# Patient Record
Sex: FEMALE | HISPANIC OR LATINO | Employment: FULL TIME
[De-identification: names, ages, dates, MRNs, and addresses within clinical notes are randomized per-mention and may not be internally consistent; named-entity substitution may affect disease eponyms.]

---

## 2017-07-21 ENCOUNTER — LAB SERVICES (OUTPATIENT)
Dept: OTHER | Age: 27
End: 2017-07-21

## 2017-07-21 ENCOUNTER — CHARTING TRANS (OUTPATIENT)
Dept: OBGYN | Age: 27
End: 2017-07-21

## 2017-07-21 ENCOUNTER — MYAURORA ACCOUNT LINK (OUTPATIENT)
Dept: OTHER | Age: 27
End: 2017-07-21

## 2017-07-21 LAB — HIV1+2 AB SERPL QL IA: NEGATIVE

## 2017-07-22 LAB — HCV AB SER QL: NEGATIVE

## 2017-07-24 LAB — RPR SER QL: NORMAL

## 2017-08-03 LAB — AP REPORT: ABNORMAL

## 2017-09-05 ENCOUNTER — CHARTING TRANS (OUTPATIENT)
Dept: OBGYN | Age: 27
End: 2017-09-05

## 2017-09-05 ENCOUNTER — MYAURORA ACCOUNT LINK (OUTPATIENT)
Dept: OTHER | Age: 27
End: 2017-09-05

## 2017-09-05 ENCOUNTER — LAB SERVICES (OUTPATIENT)
Dept: OTHER | Age: 27
End: 2017-09-05

## 2017-09-05 LAB — PREGNANCY TEST, URINE: NEGATIVE

## 2018-02-01 ENCOUNTER — CHARTING TRANS (OUTPATIENT)
Dept: OTHER | Age: 28
End: 2018-02-01

## 2018-07-18 ENCOUNTER — CHARTING TRANS (OUTPATIENT)
Dept: OTHER | Age: 28
End: 2018-07-18

## 2018-07-18 ENCOUNTER — MYAURORA ACCOUNT LINK (OUTPATIENT)
Dept: OTHER | Age: 28
End: 2018-07-18

## 2018-07-18 ENCOUNTER — LAB SERVICES (OUTPATIENT)
Dept: OTHER | Age: 28
End: 2018-07-18

## 2018-07-18 LAB
HBV SURFACE AG SERPL QL IA: NEGATIVE
HIV1+2 AB SERPL QL IA: NEGATIVE

## 2018-07-19 LAB
HBV CORE IGM SER QL: NEGATIVE
HCV AB SER QL: NEGATIVE

## 2018-07-20 ENCOUNTER — CHARTING TRANS (OUTPATIENT)
Dept: OTHER | Age: 28
End: 2018-07-20

## 2018-07-22 LAB — RPR SER QL: NORMAL

## 2018-07-24 LAB — AP REPORT: NORMAL

## 2018-10-01 ENCOUNTER — CHARTING TRANS (OUTPATIENT)
Dept: OTHER | Age: 28
End: 2018-10-01

## 2018-11-16 ENCOUNTER — LAB SERVICES (OUTPATIENT)
Dept: OTHER | Age: 28
End: 2018-11-16

## 2018-11-16 ENCOUNTER — CHARTING TRANS (OUTPATIENT)
Dept: OTHER | Age: 28
End: 2018-11-16

## 2018-11-16 ENCOUNTER — MYAURORA ACCOUNT LINK (OUTPATIENT)
Dept: OTHER | Age: 28
End: 2018-11-16

## 2018-11-16 LAB
ALBUMIN SERPL-MCNC: 4.4 G/DL (ref 3.6–5.1)
ALP SERPL-CCNC: 84 U/L (ref 45–130)
ALT SERPL W/O P-5'-P-CCNC: 22 U/L (ref 4–38)
AST SERPL-CCNC: 23 U/L (ref 14–43)
BASOPHIL %: 0.7 % (ref 0–1.2)
BASOPHIL ABSOLUTE #: 0.1 10*3/UL (ref 0–0.1)
BILIRUB SERPL-MCNC: 0.2 MG/DL (ref 0–1.3)
BUN SERPL-MCNC: 15 MG/DL (ref 7–20)
CALCIUM SERPL-MCNC: 10.2 MG/DL (ref 8.6–10.6)
CHLORIDE SERPL-SCNC: 103 MMOL/L (ref 96–107)
CHOLEST SERPL-MCNC: 205 MG/DL (ref 140–200)
CO2 SERPL-SCNC: 30 MMOL/L (ref 22–32)
CREAT SERPL-MCNC: 0.7 MG/DL (ref 0.5–1.4)
DIFFERENTIAL TYPE: ABNORMAL
EOSINOPHIL %: 4.1 % (ref 0–10)
EOSINOPHIL ABSOLUTE #: 0.4 10*3/UL (ref 0–0.5)
EST. AVERAGE GLUCOSE BLD GHB EST-MCNC: 106 MG/DL (ref 0–154)
GFR SERPL CREATININE-BSD FRML MDRD: >60 ML/MIN/{1.73M2}
GFR SERPL CREATININE-BSD FRML MDRD: >60 ML/MIN/{1.73M2}
GLUCOSE P FAST SERPL-MCNC: 97 MG/DL (ref 60–100)
HBA1C BLD-MCNC: 5.3 % (ref 4.2–6)
HDLC SERPL-MCNC: 68 MG/DL
HEMATOCRIT: 41.7 % (ref 34–45)
HEMOGLOBIN: 13.6 G/DL (ref 11.2–15.7)
LDLC SERPL CALC-MCNC: 124 MG/DL (ref 30–100)
LYMPH PERCENT: 43 % (ref 20.5–51.1)
LYMPHOCYTE ABSOLUTE #: 3.8 10*3/UL (ref 1.2–3.4)
MEAN CORPUSCULAR HGB CONCENTRATION: 32.6 % (ref 32–36)
MEAN CORPUSCULAR HGB: 29.3 PG (ref 27–34)
MEAN CORPUSCULAR VOLUME: 89.9 FL (ref 79–95)
MEAN PLATELET VOLUME: 11 FL (ref 8.6–12.4)
MONOCYTE ABSOLUTE #: 0.7 10*3/UL (ref 0.2–0.9)
MONOCYTE PERCENT: 8 % (ref 4.3–12.9)
NEUTROPHIL ABSOLUTE #: 3.9 10*3/UL (ref 1.4–6.5)
NEUTROPHIL PERCENT: 44.2 % (ref 34–73.5)
PLATELET COUNT: 221 10*3/UL (ref 150–400)
POTASSIUM SERPL-SCNC: 4.5 MMOL/L (ref 3.5–5.3)
PROT SERPL-MCNC: 8.3 G/DL (ref 6.4–8.5)
RED BLOOD CELL COUNT: 4.64 10*6/UL (ref 3.7–5.2)
RED CELL DISTRIBUTION WIDTH: 12.6 % (ref 11.3–14.8)
SODIUM SERPL-SCNC: 141 MMOL/L (ref 136–146)
TRIGL SERPL-MCNC: 67 MG/DL (ref 0–200)
TSH SERPL DL<=0.05 MIU/L-ACNC: 1.49 M[IU]/L (ref 0.3–4.82)
WHITE BLOOD CELL COUNT: 8.8 10*3/UL (ref 4–10)

## 2018-11-28 VITALS
SYSTOLIC BLOOD PRESSURE: 104 MMHG | BODY MASS INDEX: 23.92 KG/M2 | DIASTOLIC BLOOD PRESSURE: 74 MMHG | HEIGHT: 62 IN | WEIGHT: 130 LBS

## 2018-11-28 VITALS
WEIGHT: 130 LBS | HEIGHT: 62 IN | BODY MASS INDEX: 23.92 KG/M2 | DIASTOLIC BLOOD PRESSURE: 72 MMHG | SYSTOLIC BLOOD PRESSURE: 90 MMHG

## 2019-03-05 VITALS
BODY MASS INDEX: 24.66 KG/M2 | HEART RATE: 70 BPM | HEIGHT: 62 IN | TEMPERATURE: 96.6 F | SYSTOLIC BLOOD PRESSURE: 100 MMHG | WEIGHT: 134 LBS | DIASTOLIC BLOOD PRESSURE: 70 MMHG

## 2019-03-05 VITALS
SYSTOLIC BLOOD PRESSURE: 110 MMHG | WEIGHT: 138 LBS | HEIGHT: 62 IN | BODY MASS INDEX: 25.4 KG/M2 | DIASTOLIC BLOOD PRESSURE: 64 MMHG

## 2019-11-29 ENCOUNTER — LAB SERVICES (OUTPATIENT)
Dept: LAB | Age: 29
End: 2019-11-29

## 2019-11-29 ENCOUNTER — OFFICE VISIT (OUTPATIENT)
Dept: OBGYN | Age: 29
End: 2019-11-29

## 2019-11-29 VITALS
WEIGHT: 137 LBS | HEIGHT: 62 IN | DIASTOLIC BLOOD PRESSURE: 64 MMHG | SYSTOLIC BLOOD PRESSURE: 106 MMHG | BODY MASS INDEX: 25.21 KG/M2

## 2019-11-29 DIAGNOSIS — Z12.4 PAP SMEAR FOR CERVICAL CANCER SCREENING: ICD-10-CM

## 2019-11-29 DIAGNOSIS — Z11.3 SCREEN FOR STD (SEXUALLY TRANSMITTED DISEASE): ICD-10-CM

## 2019-11-29 DIAGNOSIS — Z01.419 ENCOUNTER FOR GYNECOLOGICAL EXAMINATION WITHOUT ABNORMAL FINDING: Primary | ICD-10-CM

## 2019-11-29 DIAGNOSIS — Z12.4 ENCOUNTER FOR SCREENING FOR MALIGNANT NEOPLASM OF CERVIX: ICD-10-CM

## 2019-11-29 LAB
HBV SURFACE AG SERPL QL IA: NEGATIVE
HEPATITIS C ANTIBODY: NEGATIVE
HIV1+2 AB SERPL QL IA: NEGATIVE

## 2019-11-29 PROCEDURE — 88142 CYTOPATH C/V THIN LAYER: CPT | Performed by: PATHOLOGY

## 2019-11-29 PROCEDURE — 99395 PREV VISIT EST AGE 18-39: CPT | Performed by: NURSE PRACTITIONER

## 2019-11-29 PROCEDURE — 36415 COLL VENOUS BLD VENIPUNCTURE: CPT | Performed by: NURSE PRACTITIONER

## 2019-11-29 PROCEDURE — 86592 SYPHILIS TEST NON-TREP QUAL: CPT | Performed by: NURSE PRACTITIONER

## 2019-11-29 PROCEDURE — 87491 CHLMYD TRACH DNA AMP PROBE: CPT | Performed by: PATHOLOGY

## 2019-11-29 PROCEDURE — 87340 HEPATITIS B SURFACE AG IA: CPT | Performed by: NURSE PRACTITIONER

## 2019-11-29 PROCEDURE — 87591 N.GONORRHOEAE DNA AMP PROB: CPT | Performed by: PATHOLOGY

## 2019-11-29 PROCEDURE — 86703 HIV-1/HIV-2 1 RESULT ANTBDY: CPT | Performed by: NURSE PRACTITIONER

## 2019-11-29 PROCEDURE — 86803 HEPATITIS C AB TEST: CPT | Performed by: NURSE PRACTITIONER

## 2019-11-29 RX ORDER — ETONOGESTREL AND ETHINYL ESTRADIOL VAGINAL .015; .12 MG/D; MG/D
RING VAGINAL DAILY
COMMUNITY
Start: 2017-01-26 | End: 2019-11-29 | Stop reason: SDUPTHER

## 2019-11-29 RX ORDER — FLUCONAZOLE 150 MG/1
TABLET ORAL
Qty: 2 TABLET | Refills: 1 | Status: SHIPPED | OUTPATIENT
Start: 2019-11-29

## 2019-11-29 RX ORDER — ETONOGESTREL AND ETHINYL ESTRADIOL VAGINAL .015; .12 MG/D; MG/D
RING VAGINAL
Qty: 3 EACH | Refills: 4 | Status: SHIPPED | OUTPATIENT
Start: 2019-11-29

## 2019-11-29 SDOH — HEALTH STABILITY: MENTAL HEALTH: HOW OFTEN DO YOU HAVE A DRINK CONTAINING ALCOHOL?: 2-4 TIMES A MONTH

## 2019-11-30 LAB
C TRACH DNA SPEC QL NAA+PROBE: NEGATIVE
N GONORRHOEA DNA SPEC QL NAA+PROBE: NEGATIVE
RPR SER QL: NORMAL

## 2019-11-30 ASSESSMENT — PATIENT HEALTH QUESTIONNAIRE - PHQ9
2. FEELING DOWN, DEPRESSED OR HOPELESS: NOT AT ALL
SUM OF ALL RESPONSES TO PHQ9 QUESTIONS 1 AND 2: 0
1. LITTLE INTEREST OR PLEASURE IN DOING THINGS: NOT AT ALL
SUM OF ALL RESPONSES TO PHQ9 QUESTIONS 1 AND 2: 0

## 2019-12-05 LAB — AP REPORT: NORMAL

## 2019-12-05 RX ORDER — METRONIDAZOLE 500 MG/1
500 TABLET ORAL 2 TIMES DAILY
Qty: 14 TABLET | Refills: 0 | Status: SHIPPED | OUTPATIENT
Start: 2019-12-05 | End: 2019-12-12

## 2023-03-05 PROBLEM — E55.9 VITAMIN D DEFICIENCY: Status: ACTIVE | Noted: 2023-03-05

## 2023-03-05 PROBLEM — E78.1 PURE HYPERGLYCERIDEMIA: Status: ACTIVE | Noted: 2023-03-05

## 2023-03-05 PROBLEM — F33.0 MAJOR DEPRESSIVE DISORDER, RECURRENT EPISODE, MILD (CMD): Status: ACTIVE | Noted: 2023-03-05

## 2024-02-01 ENCOUNTER — LAB ENCOUNTER (OUTPATIENT)
Dept: LAB | Age: 34
End: 2024-02-01
Attending: NURSE PRACTITIONER
Payer: COMMERCIAL

## 2024-02-01 ENCOUNTER — OFFICE VISIT (OUTPATIENT)
Dept: OBGYN CLINIC | Facility: CLINIC | Age: 34
End: 2024-02-01
Payer: COMMERCIAL

## 2024-02-01 VITALS
DIASTOLIC BLOOD PRESSURE: 82 MMHG | HEIGHT: 62 IN | SYSTOLIC BLOOD PRESSURE: 124 MMHG | BODY MASS INDEX: 29.35 KG/M2 | WEIGHT: 159.5 LBS | HEART RATE: 107 BPM

## 2024-02-01 DIAGNOSIS — Z11.3 SCREEN FOR STD (SEXUALLY TRANSMITTED DISEASE): ICD-10-CM

## 2024-02-01 DIAGNOSIS — Z30.44 ENCOUNTER FOR SURVEILLANCE OF VAGINAL RING HORMONAL CONTRACEPTIVE DEVICE: ICD-10-CM

## 2024-02-01 DIAGNOSIS — Z01.419 WELL WOMAN EXAM WITH ROUTINE GYNECOLOGICAL EXAM: Primary | ICD-10-CM

## 2024-02-01 DIAGNOSIS — Z11.51 SCREENING FOR HUMAN PAPILLOMAVIRUS (HPV): ICD-10-CM

## 2024-02-01 DIAGNOSIS — Z12.4 SCREENING FOR CERVICAL CANCER: ICD-10-CM

## 2024-02-01 LAB — T PALLIDUM AB SER QL IA: NONREACTIVE

## 2024-02-01 PROCEDURE — 87389 HIV-1 AG W/HIV-1&-2 AB AG IA: CPT

## 2024-02-01 PROCEDURE — 87491 CHLMYD TRACH DNA AMP PROBE: CPT | Performed by: NURSE PRACTITIONER

## 2024-02-01 PROCEDURE — 87625 HPV TYPES 16 & 18 ONLY: CPT | Performed by: NURSE PRACTITIONER

## 2024-02-01 PROCEDURE — 88175 CYTOPATH C/V AUTO FLUID REDO: CPT | Performed by: NURSE PRACTITIONER

## 2024-02-01 PROCEDURE — 3074F SYST BP LT 130 MM HG: CPT | Performed by: NURSE PRACTITIONER

## 2024-02-01 PROCEDURE — 87624 HPV HI-RISK TYP POOLED RSLT: CPT | Performed by: NURSE PRACTITIONER

## 2024-02-01 PROCEDURE — 87591 N.GONORRHOEAE DNA AMP PROB: CPT | Performed by: NURSE PRACTITIONER

## 2024-02-01 PROCEDURE — 3079F DIAST BP 80-89 MM HG: CPT | Performed by: NURSE PRACTITIONER

## 2024-02-01 PROCEDURE — 99385 PREV VISIT NEW AGE 18-39: CPT | Performed by: NURSE PRACTITIONER

## 2024-02-01 PROCEDURE — 86780 TREPONEMA PALLIDUM: CPT

## 2024-02-01 PROCEDURE — 3008F BODY MASS INDEX DOCD: CPT | Performed by: NURSE PRACTITIONER

## 2024-02-01 RX ORDER — PHENTERMINE HYDROCHLORIDE 37.5 MG/1
TABLET ORAL
COMMUNITY
Start: 2023-12-19

## 2024-02-01 RX ORDER — ETONOGESTREL/ETHINYL ESTRADIOL .12-.015MG
RING, VAGINAL VAGINAL
COMMUNITY
Start: 2024-01-06 | End: 2024-02-01

## 2024-02-01 RX ORDER — ETONOGESTREL AND ETHINYL ESTRADIOL VAGINAL RING .015; .12 MG/D; MG/D
1 RING VAGINAL
Qty: 3 EACH | Refills: 3 | Status: SHIPPED | OUTPATIENT
Start: 2024-02-01 | End: 2025-04-26

## 2024-02-01 NOTE — PROGRESS NOTES
Subjective:  Chief Complaint   Patient presents with    Physical     Annual with STI testing     33 year old female  presents for annual.    Patient's last menstrual period was 2024 (approximate).  Hx Prior Abnormal Pap: Yes  Pap Date: 19  Pap Result Notes: wnl  Follow Up Recommendation: Hx colpo  Menarche: 15 (2024  2:35 PM)  Period Cycle (Days): 28 days (2024  2:35 PM)  Period Duration (Days): 3 days (2024  2:35 PM)  Period Flow: first day heavy then moderate then light (2024  2:35 PM)  Use of Birth Control (if yes, specify type): Nuvaring (2024  2:35 PM)  Hx Prior Abnormal Pap: Yes (2024  2:35 PM)  Pap Date: 19 (2024  2:35 PM)  Pap Result Notes: wnl (2024  2:35 PM)  Follow Up Recommendation: Hx colpo (2024  2:35 PM)    Sexually active with male partner  Requesting STD testing today     Contraception: Nuva ring - tolerating well, would like refill    Denies personal family history of blood clotting disorder  Endorses migraines but denies aura, no increase in migraine since nuva ring    Denies family history of breast, ovarian and colon CA.    Feeling safe at home.    Most Recent Immunizations   Administered Date(s) Administered    Covid-19 Vaccine Moderna 100 mcg/0.5 ml 2021      reports that she has never smoked. She has never used smokeless tobacco.   reports current alcohol use.    History reviewed. No pertinent past medical history.  Past Surgical History:   Procedure Laterality Date    JAW SURGERY      underbite       Review of Systems:  Pertinent items are noted in the HPI.    Objective:  /82   Pulse 107   Ht 62\"   Wt 159 lb 8 oz (72.3 kg)   LMP 2024 (Approximate)   BMI 29.17 kg/m²    Physical Examination:  General appearance: Well dressed, well nourished in no apparent distress  Neurologic/Psychiatric: Alert and oriented to person, place and time, mood normal, affect appropriate  Head: Normocephalic without obvious  deformity, atraumatic  Neck: No thyromegaly, supple, non-tender, no masses, no adenopathy  Lungs: Clear to auscultation bilaterally, no rales, wheezes or rhonchi  Breasts: Symmetric, non-tender, no masses, lesions, retraction, dimpling or discharge bilaterally, no axillary or supraclavicular lymphadenopathy  Heart: Regular rate and rhythm, no gallops or murmurs  Abdomen: Soft, non-tender, non-distended, no masses, no hepatosplenomegaly, no hernias, no inguinal lymphadenopathy  Pelvic:    External genitalia- Normal, Bartholin's, urethra, skeins glands normal   Vagina- No vaginal lesions, physiologic discharge   Cervix- No lesions, long/closed, no cervical motion tenderness   Uterus- Normal sized, non-tender, no masses   Adnexa-  Non-tender, no masses  Extremities: Non-tender, full range of motion, no clubbing, cyanosis or edema  Skin:  General inspection- no rashes, lesions or discoloration  Pap smear done    Assessment/Plan:  Normal well-woman exam.  Yearly mammogram at age 42.  Pap smear obtained.      Diagnoses and all orders for this visit:    Well woman exam with routine gynecological exam  - self breast exam discussed and encouraged    Screening for cervical cancer  -     ThinPrep PAP Smear; Future    Screening for human papillomavirus (HPV)  -     Hpv Dna  High Risk , Thin Prep Collect; Future    Screen for STD (sexually transmitted disease)  -     Chlamydia/Gc Amplification; Future  -     HIV Ag/Ab Combo; Future  -     T Pallidum Screening [place]; Future    Encounter for surveillance of vaginal ring hormonal contraceptive device  -     Etonogestrel-Ethinyl Estradiol (NUVARING) 0.12-0.015 MG/24HR Vaginal Ring; Place 1 Ring vaginally every 30 (thirty) days. 3 weeks in, one week out         Return in about 1 year (around 2/1/2025) for annual well woman exam or sooner if needed.

## 2024-02-02 LAB
C TRACH DNA SPEC QL NAA+PROBE: NEGATIVE
HPV I/H RISK 1 DNA SPEC QL NAA+PROBE: POSITIVE
N GONORRHOEA DNA SPEC QL NAA+PROBE: NEGATIVE

## 2024-02-06 LAB
HPV16 DNA CVX QL PROBE+SIG AMP: NEGATIVE
HPV18 DNA CVX QL PROBE+SIG AMP: NEGATIVE

## 2024-02-08 LAB
.: ABNORMAL
.: ABNORMAL

## 2025-02-21 ENCOUNTER — OFFICE VISIT (OUTPATIENT)
Dept: OBGYN CLINIC | Facility: CLINIC | Age: 35
End: 2025-02-21
Payer: COMMERCIAL

## 2025-02-21 ENCOUNTER — LAB ENCOUNTER (OUTPATIENT)
Dept: LAB | Age: 35
End: 2025-02-21
Attending: NURSE PRACTITIONER
Payer: COMMERCIAL

## 2025-02-21 VITALS
DIASTOLIC BLOOD PRESSURE: 86 MMHG | HEIGHT: 62 IN | WEIGHT: 164 LBS | BODY MASS INDEX: 30.18 KG/M2 | SYSTOLIC BLOOD PRESSURE: 120 MMHG

## 2025-02-21 DIAGNOSIS — Z30.44 ENCOUNTER FOR SURVEILLANCE OF VAGINAL RING HORMONAL CONTRACEPTIVE DEVICE: ICD-10-CM

## 2025-02-21 DIAGNOSIS — L73.1 INGROWN HAIR: ICD-10-CM

## 2025-02-21 DIAGNOSIS — Z12.4 SCREENING FOR CERVICAL CANCER: ICD-10-CM

## 2025-02-21 DIAGNOSIS — Z11.3 SCREEN FOR STD (SEXUALLY TRANSMITTED DISEASE): ICD-10-CM

## 2025-02-21 DIAGNOSIS — Z01.419 WELL WOMAN EXAM WITH ROUTINE GYNECOLOGICAL EXAM: Primary | ICD-10-CM

## 2025-02-21 DIAGNOSIS — Z11.51 SCREENING FOR HUMAN PAPILLOMAVIRUS (HPV): ICD-10-CM

## 2025-02-21 LAB — T PALLIDUM AB SER QL IA: NONREACTIVE

## 2025-02-21 PROCEDURE — 87491 CHLMYD TRACH DNA AMP PROBE: CPT | Performed by: NURSE PRACTITIONER

## 2025-02-21 PROCEDURE — 86780 TREPONEMA PALLIDUM: CPT | Performed by: NURSE PRACTITIONER

## 2025-02-21 PROCEDURE — 3008F BODY MASS INDEX DOCD: CPT | Performed by: NURSE PRACTITIONER

## 2025-02-21 PROCEDURE — 87625 HPV TYPES 16 & 18 ONLY: CPT | Performed by: NURSE PRACTITIONER

## 2025-02-21 PROCEDURE — 3079F DIAST BP 80-89 MM HG: CPT | Performed by: NURSE PRACTITIONER

## 2025-02-21 PROCEDURE — 88175 CYTOPATH C/V AUTO FLUID REDO: CPT | Performed by: NURSE PRACTITIONER

## 2025-02-21 PROCEDURE — 3074F SYST BP LT 130 MM HG: CPT | Performed by: NURSE PRACTITIONER

## 2025-02-21 PROCEDURE — 87389 HIV-1 AG W/HIV-1&-2 AB AG IA: CPT | Performed by: NURSE PRACTITIONER

## 2025-02-21 PROCEDURE — 87624 HPV HI-RISK TYP POOLED RSLT: CPT | Performed by: NURSE PRACTITIONER

## 2025-02-21 PROCEDURE — 99459 PELVIC EXAMINATION: CPT | Performed by: NURSE PRACTITIONER

## 2025-02-21 PROCEDURE — 87591 N.GONORRHOEAE DNA AMP PROB: CPT | Performed by: NURSE PRACTITIONER

## 2025-02-21 PROCEDURE — 99395 PREV VISIT EST AGE 18-39: CPT | Performed by: NURSE PRACTITIONER

## 2025-02-21 RX ORDER — METHYLPHENIDATE HYDROCHLORIDE EXTENDED RELEASE 20 MG/1
TABLET ORAL
COMMUNITY
Start: 2025-02-15

## 2025-02-21 RX ORDER — TIRZEPATIDE 5 MG/.5ML
INJECTION, SOLUTION SUBCUTANEOUS
COMMUNITY
Start: 2025-02-04

## 2025-02-21 RX ORDER — TIRZEPATIDE 2.5 MG/.5ML
INJECTION, SOLUTION SUBCUTANEOUS
COMMUNITY
Start: 2025-01-12

## 2025-02-21 RX ORDER — ETONOGESTREL AND ETHINYL ESTRADIOL VAGINAL RING .015; .12 MG/D; MG/D
1 RING VAGINAL
Qty: 3 EACH | Refills: 3 | Status: SHIPPED | OUTPATIENT
Start: 2025-02-21 | End: 2026-05-17

## 2025-02-21 NOTE — PROGRESS NOTES
Subjective:  Chief Complaint   Patient presents with    Other     Patient here for STI/STD testing and a repeat PAP smear      34 year old female  presents for annual.    Pt with concerns of lump to groin  + painful    Patient's last menstrual period was 2025 (approximate).  Hx Prior Abnormal Pap: Yes  Pap Date: 24  Pap Result Notes: abnormal  Follow Up Recommendation: repeat pap in 1 year  Menarche: 15 (2025  1:38 PM)  Period Cycle (Days): 28 days (2025  1:38 PM)  Period Duration (Days): 4-5 days (2025  1:38 PM)  Period Flow: first day heavy then moderate then light (2025  1:38 PM)  Use of Birth Control (if yes, specify type): Nuvaring (2025  1:38 PM)  Hx Prior Abnormal Pap: Yes (2025  1:38 PM)  Pap Date: 24 (2025  1:38 PM)  Pap Result Notes: abnormal (2025  1:38 PM)  Follow Up Recommendation: repeat pap in 1 year (2025  1:38 PM)       Pelvic Infections/STD: STD screen today  Contraception: nuvaring    Denies family history of breast, ovarian and colon CA.    Feeling safe at home.    Most Recent Immunizations   Administered Date(s) Administered    Covid-19 Vaccine Moderna 100 mcg/0.5 ml 2021      reports that she has never smoked. She has never used smokeless tobacco.   reports current alcohol use.    History reviewed. No pertinent past medical history.  Past Surgical History:   Procedure Laterality Date    Jaw surgery      underbite       Review of Systems:  Pertinent items are noted in the HPI.    Objective:  /86   Ht 62\"   Wt 164 lb (74.4 kg)   LMP 2025 (Approximate)   BMI 30.00 kg/m²    Physical Examination:  General appearance: Well dressed, well nourished in no apparent distress  Neurologic/Psychiatric: Alert and oriented to person, place and time, mood normal, affect appropriate  Head: Normocephalic without obvious deformity, atraumatic  Neck: No thyromegaly, supple, non-tender, no masses, no adenopathy  Lungs:  Clear to auscultation bilaterally, no rales, wheezes or rhonchi  Breasts: Symmetric, non-tender, no masses, lesions, retraction, dimpling or discharge bilaterally, no axillary or supraclavicular lymphadenopathy  Heart: Regular rate and rhythm, no gallops or murmurs  Abdomen: Soft, non-tender, non-distended, no masses, no hepatosplenomegaly, no hernias, no inguinal lymphadenopathy  Pelvic:    External genitalia- + ingrown hair to mons pubis, Bartholin's, urethra, skeins glands normal   Vagina- No vaginal lesions, physiologic discharge   Cervix- No lesions, long/closed, no cervical motion tenderness   Uterus- Normal sized, non-tender, no masses   Adnexa-  Non-tender, no masses  Extremities: Non-tender, full range of motion, no clubbing, cyanosis or edema  Skin:  General inspection- no rashes, lesions or discoloration  Pap smear done    Assessment/Plan:  Normal well-woman exam.  Yearly mammogram at age 42.  Pap smear obtained.    Declined chaperone for exam today     Diagnoses and all orders for this visit:    Well woman exam with routine gynecological exam  - self breast exam discussed and encouraged    Screening for cervical cancer  -     ThinPrep PAP Smear; Future    Screening for human papillomavirus (HPV)  -     Hpv Dna  High Risk , Thin Prep Collect; Future    Screen for STD (sexually transmitted disease)  -     Chlamydia/Gc Amplification; Future  -     HIV Ag/Ab Combo; Future  -     T Pallidum Screening [place]; Future    Encounter for surveillance of vaginal ring hormonal contraceptive device  -     Etonogestrel-Ethinyl Estradiol (NUVARING) 0.12-0.015 MG/24HR Vaginal Ring; Place 1 Ring vaginally every 30 (thirty) days. 3 weeks in, one week out    Ingrown hair  - warm compresses QID  - keep area clean and dry  - avoid shaving/waxing for 14 days  - to follow up with new/worsening symptoms or no improvement       Return in about 1 year (around 2/21/2026) for annual well woman exam or sooner if needed.

## 2025-02-24 ENCOUNTER — PATIENT MESSAGE (OUTPATIENT)
Dept: OBGYN CLINIC | Facility: CLINIC | Age: 35
End: 2025-02-24

## 2025-02-24 LAB
C TRACH DNA SPEC QL NAA+PROBE: NEGATIVE
N GONORRHOEA DNA SPEC QL NAA+PROBE: NEGATIVE

## 2025-02-25 ENCOUNTER — HOSPITAL ENCOUNTER (OUTPATIENT)
Age: 35
Discharge: HOME | End: 2025-02-25
Payer: COMMERCIAL

## 2025-02-25 VITALS
HEART RATE: 82 BPM | WEIGHT: 159 LBS | OXYGEN SATURATION: 98 % | HEIGHT: 62 IN | RESPIRATION RATE: 16 BRPM | BODY MASS INDEX: 29.26 KG/M2 | DIASTOLIC BLOOD PRESSURE: 94 MMHG | SYSTOLIC BLOOD PRESSURE: 138 MMHG | TEMPERATURE: 98 F

## 2025-02-25 DIAGNOSIS — Z71.1 CONCERN ABOUT STD IN FEMALE WITHOUT DIAGNOSIS: ICD-10-CM

## 2025-02-25 DIAGNOSIS — Z20.2 EXPOSURE TO CHLAMYDIA: Primary | ICD-10-CM

## 2025-02-25 LAB
B-HCG UR QL: NEGATIVE
BILIRUB UR QL STRIP: NEGATIVE
CLARITY UR: CLEAR
COLOR UR: YELLOW
GLUCOSE UR STRIP-MCNC: NEGATIVE MG/DL
HGB UR QL STRIP: NEGATIVE
HPV E6+E7 MRNA CVX QL NAA+PROBE: POSITIVE
KETONES UR STRIP-MCNC: NEGATIVE MG/DL
LEUKOCYTE ESTERASE UR QL STRIP: NEGATIVE
NITRITE UR QL STRIP: NEGATIVE
PH UR STRIP: 8 [PH]
PROT UR STRIP-MCNC: NEGATIVE MG/DL
SP GR UR STRIP: 1.01
UROBILINOGEN UR STRIP-ACNC: <2 MG/DL

## 2025-02-25 PROCEDURE — 87491 CHLMYD TRACH DNA AMP PROBE: CPT | Performed by: PHYSICIAN ASSISTANT

## 2025-02-25 PROCEDURE — 81002 URINALYSIS NONAUTO W/O SCOPE: CPT | Performed by: PHYSICIAN ASSISTANT

## 2025-02-25 PROCEDURE — 87591 N.GONORRHOEAE DNA AMP PROB: CPT | Performed by: PHYSICIAN ASSISTANT

## 2025-02-25 PROCEDURE — 99204 OFFICE O/P NEW MOD 45 MIN: CPT | Performed by: PHYSICIAN ASSISTANT

## 2025-02-25 PROCEDURE — 81025 URINE PREGNANCY TEST: CPT | Performed by: PHYSICIAN ASSISTANT

## 2025-02-25 RX ORDER — DOXYCYCLINE 100 MG/1
100 CAPSULE ORAL 2 TIMES DAILY
Qty: 14 CAPSULE | Refills: 0 | Status: SHIPPED | OUTPATIENT
Start: 2025-02-25 | End: 2025-03-04

## 2025-02-25 NOTE — ED INITIAL ASSESSMENT (HPI)
Pt was notified by her partner yesterday that she was exposed to chlamydia. Pt was last intimate with her partner on Sunday. No symptoms.

## 2025-02-25 NOTE — ED PROVIDER NOTES
Patient Seen in: [facility]      History     Chief Complaint   Patient presents with    Sexually Transmitted Infection Screen     Stated Complaint: std testing    Subjective:   The history is provided by the patient.         35 yo female presents to the immediate care due to STI exposure.  Patient states she was recently evaluated for OB/GYN office for her annual Pap and STD testing.  All STD testing was negative.  Patient subsequently had unprotected sexual intercourse 3 days ago.  Was told yesterday he did test positive for chlamydia and now on antibiotics.  Patient herself is having no symptoms -denies any vaginal discharge or pelvic pain..  No history of STIs before in the past.  Last menstrual cycle was 2 weeks ago and normal.  Would like to be treated for chlamydia.    Objective:     History reviewed. No pertinent past medical history.           Past Surgical History:   Procedure Laterality Date    Jaw surgery  2013    underbite                Social History     Socioeconomic History    Marital status: Single   Tobacco Use    Smoking status: Never    Smokeless tobacco: Never   Vaping Use    Vaping status: Never Used   Substance and Sexual Activity    Alcohol use: Yes     Comment: socially    Drug use: Never    Sexual activity: Yes     Partners: Male     Birth control/protection: Inserts     Comment: nuvaring   Other Topics Concern    Caffeine Concern Yes    Exercise Yes    Seat Belt Yes     Social Drivers of Health     Food Insecurity: No Food Insecurity (7/21/2024)    Received from [facility]    Food Insecurity     Currently or in the past 3 months, have you worried your food would run out before you had money to buy more?: No     In the past 12 months, have you run out of food or been unable to get more?: No   Transportation Needs: No Transportation Needs (7/21/2024)    Received from [facility]    Transportation Needs     Currently or in the past 3 months,  has lack of transportation kept you from medical appointments, getting food or medicine, or providing care to a family member?: Unrecognized value     Medical Transportation Needs?: No    Received from [facility]    Housing Stability              Review of Systems   Respiratory: Negative.     Cardiovascular: Negative.    Gastrointestinal: Negative.    Genitourinary: Negative.        Positive for stated complaint: std testing  Other systems are as noted in HPI.  Constitutional and vital signs reviewed.      All other systems reviewed and negative except as noted above.    Physical Exam     ED Triage Vitals [02/25/25 1342]   BP (!) 138/94   Pulse 82   Resp 16   Temp 98.4 °F (36.9 °C)   Temp src Oral   SpO2 98 %   O2 Device None (Room air)       Current Vitals:   Vital Signs  BP: (!) 138/94  Pulse: 82  Resp: 16  Temp: 98.4 °F (36.9 °C)  Temp src: Oral    Oxygen Therapy  SpO2: 98 %  O2 Device: None (Room air)        Physical Exam  Vitals and nursing note reviewed. Exam conducted with a chaperone present.   Constitutional:       General: She is not in acute distress.     Appearance: Normal appearance.   Pulmonary:      Effort: Pulmonary effort is normal.   Genitourinary:     General: Normal vulva.      Exam position: Supine.      Labia:         Right: No rash, tenderness, lesion or injury.         Left: No rash, tenderness, lesion or injury.       Vagina: Vaginal discharge (Scant) present.      Cervix: Normal. No cervical motion tenderness, discharge, friability or erythema.      Uterus: Normal.       Adnexa: Right adnexa normal and left adnexa normal.   Neurological:      General: No focal deficit present.      Mental Status: She is alert and oriented to person, place, and time.             ED Course     Labs Reviewed   POCT PREGNANCY URINE - Normal   [facility] POCT URINALYSIS DIPSTICK   CHLAMYDIA/GONOCOCCUS, KETTY                   MDM   Ddx-candidiasis, bacterial vaginosis, STI, PID        On exam the  patient is in no acute distress.  Her vital signs are stable.  Her abdomen is soft and nontender.  Pelvic exam performed with a chaperone showing scant discharge. no cervical motion tenderness. gonorrhea/chlamydia screening was performed and pending.  UA shows no acute findings .  Pregnancy test is negative.  Clinically we will treat the patient chlamydia due to recent exposure.  Advised to be sexually abstinent until results of finalized.  Strict return precautions were discussed and the patient is have close follow-up with the primary care doctor or gynecologist        Medical Decision Making  Problems Addressed:  Concern about STD in female without diagnosis: acute illness or injury  Exposure to chlamydia: acute illness or injury    Amount and/or Complexity of Data Reviewed  Labs: ordered. Decision-making details documented in ED Course.    Risk  OTC drugs.  Prescription drug management.        Disposition and Plan     Clinical Impression:  1. Exposure to chlamydia    2. Concern about STD in female without diagnosis         Disposition:  Discharge  2/25/2025  2:17 pm    Follow-up:  Wendy Iqbal, APRN  [id number] [facility] [address] [address] [phone]                Medications Prescribed:  Discharge Medication List as of 2/25/2025  2:24 PM        START taking these medications    Details   doxycycline 100 MG Oral Cap Take 1 capsule (100 mg total) by mouth 2 (two) times daily for 7 days., Normal, Disp-14 capsule, R-0                 Supplementary Documentation:

## 2025-02-25 NOTE — DISCHARGE INSTRUCTIONS
No sexual intercourse until results of finalized, completed the antibiotics and to be tested  Continue follow-up with your OB/GYN  Return to the ER symptoms worsen

## 2025-02-26 LAB
C TRACH DNA SPEC QL NAA+PROBE: POSITIVE
N GONORRHOEA DNA SPEC QL NAA+PROBE: NEGATIVE

## 2025-02-27 LAB
HPV16 DNA CVX QL PROBE+SIG AMP: NEGATIVE
HPV18 DNA CVX QL PROBE+SIG AMP: NEGATIVE

## 2025-02-27 NOTE — TELEPHONE ENCOUNTER
Pt. Called back and told her to call back and schedule her WALTER appointment for 4 weeks after completion of her antibiotics.with Wendy.  Pt. Verbalizes understanding of information and agrees to plan.

## 2025-02-27 NOTE — TELEPHONE ENCOUNTER
Please review pt's MCM and then review her note from  when she went on 2/25/25 was swabbed and test came back + CHLAMYDIA and was pt. given treatment for this .  Doxycycline.100 mg .BID x 7 days.  When should she come back for WALTER?   Wendy saw pt. 2/21/25 for WWE.  Please advise.  Thanks

## 2025-04-02 ENCOUNTER — OFFICE VISIT (OUTPATIENT)
Dept: OBGYN CLINIC | Facility: CLINIC | Age: 35
End: 2025-04-02
Payer: COMMERCIAL

## 2025-04-02 VITALS
DIASTOLIC BLOOD PRESSURE: 82 MMHG | WEIGHT: 159.81 LBS | SYSTOLIC BLOOD PRESSURE: 112 MMHG | HEIGHT: 62 IN | HEART RATE: 75 BPM | BODY MASS INDEX: 29.41 KG/M2

## 2025-04-02 DIAGNOSIS — Z11.3 SCREENING EXAMINATION FOR STD (SEXUALLY TRANSMITTED DISEASE): ICD-10-CM

## 2025-04-02 DIAGNOSIS — Z01.812 PRE-PROCEDURAL LABORATORY EXAMINATION: Primary | ICD-10-CM

## 2025-04-02 DIAGNOSIS — R87.612 PAPANICOLAOU SMEAR OF CERVIX WITH LOW GRADE SQUAMOUS INTRAEPITHELIAL LESION (LGSIL): ICD-10-CM

## 2025-04-02 LAB
CONTROL LINE PRESENT WITH A CLEAR BACKGROUND (YES/NO): YES YES/NO
KIT LOT #: NORMAL NUMERIC
PREGNANCY TEST, URINE: NEGATIVE

## 2025-04-02 PROCEDURE — 3074F SYST BP LT 130 MM HG: CPT | Performed by: OBSTETRICS & GYNECOLOGY

## 2025-04-02 PROCEDURE — 3079F DIAST BP 80-89 MM HG: CPT | Performed by: OBSTETRICS & GYNECOLOGY

## 2025-04-02 PROCEDURE — 3008F BODY MASS INDEX DOCD: CPT | Performed by: OBSTETRICS & GYNECOLOGY

## 2025-04-02 PROCEDURE — 87591 N.GONORRHOEAE DNA AMP PROB: CPT | Performed by: OBSTETRICS & GYNECOLOGY

## 2025-04-02 PROCEDURE — 87491 CHLMYD TRACH DNA AMP PROBE: CPT | Performed by: OBSTETRICS & GYNECOLOGY

## 2025-04-02 PROCEDURE — 81025 URINE PREGNANCY TEST: CPT | Performed by: OBSTETRICS & GYNECOLOGY

## 2025-04-02 PROCEDURE — 57454 BX/CURETT OF CERVIX W/SCOPE: CPT | Performed by: OBSTETRICS & GYNECOLOGY

## 2025-04-02 NOTE — PROCEDURES
Colposcopy Procedure Note    Date of Procedure: 04/02/25    Indications: 34 year old y/o female with LSIL    Pre-procedure diagnosis:   LSIL    Post-procedure diagnosis:  LSIL    Procedure:  Colposcopy   Cervical Biopsy   ECC     Pap Smear History:   11/2019 - NILM   2/2024 - LSIL HPV+ (16/8/45 negative)  2/2025 - LSIL HPV+ (16/18/45 negative)    Procedure Details:   A discussion was held with patient including diagnosis, prognosis and management. Recommendation made for colposcopy with possible biopsies. Risks, benefits and alteratives were discussed with the patient. The patient was agreed to proceed with procedure. She provided written and verbal consent. The patient was positioned supine and in stir-ups.    The sterile speculum was placed in the vagina and the cervix was visualized. The vagina appeared normal with no lesions or discolorations noted. The cervix was then swabbed with sterile saline and no lesions seen under gross examination.      After application of acetic acid, white feathery acetowhite changes were noted circumferentially around the cervical os. Please refer to image provided below. No mocaism, no punctations seen on gross examination. This findings were consistent after the application of Lugol's solution.     A biopsy was then collected at each indicated lesion site.     Physical Exam  Genitourinary:           Comments: Circumferential acetowhite change around cervical os. Biopsies obtained from 6:00, 9:00, and 12:00         The transformation zone was not fully visualized. No lesions noted. Satisfactory Colposcopy. Therefore, ECC was collected.     Biopsy sites were examined. Monsel's solution was applied to the cervix. Good hemostasis noted. All instruments were removed from the vagina.     All tissue were placed into the designated specimen containers and collected to be sent to pathology.     Impression: PAYAM I pending final pathology     Disposition: Stable. RTC in 1 year for well woman  exam or sooner if needed.        Emy Barraza MD   [facility] - OBGYN      Note to patient and family   The 21st Century Cures Act makes medical notes available to patients in the interest of transparency.  However, please be advised that this is a medical document.  It is intended as [id number] communication.  It is written and medical language may contain abbreviations or verbiage that are technical and unfamiliar.  It may appear blunt or direct.  Medical documents are intended to carry relevant information, facts as evident, and the clinical opinion of the practitioner.

## 2025-04-03 LAB
C TRACH DNA SPEC QL NAA+PROBE: NEGATIVE
N GONORRHOEA DNA SPEC QL NAA+PROBE: NEGATIVE

## 2025-04-16 ENCOUNTER — OFFICE VISIT (OUTPATIENT)
Dept: OBGYN CLINIC | Facility: CLINIC | Age: 35
End: 2025-04-16
Payer: COMMERCIAL

## 2025-04-16 VITALS
WEIGHT: 153 LBS | DIASTOLIC BLOOD PRESSURE: 80 MMHG | HEART RATE: 98 BPM | SYSTOLIC BLOOD PRESSURE: 104 MMHG | HEIGHT: 62 IN | BODY MASS INDEX: 28.16 KG/M2

## 2025-04-16 DIAGNOSIS — Z23 NEED FOR VACCINATION: ICD-10-CM

## 2025-04-16 DIAGNOSIS — N76.0 VAGINITIS AND VULVOVAGINITIS: ICD-10-CM

## 2025-04-16 DIAGNOSIS — R87.619 ABNORMAL GLANDULAR PAPANICOLAOU SMEAR OF CERVIX: Primary | ICD-10-CM

## 2025-04-16 PROCEDURE — 99214 OFFICE O/P EST MOD 30 MIN: CPT | Performed by: OBSTETRICS & GYNECOLOGY

## 2025-04-16 PROCEDURE — 3074F SYST BP LT 130 MM HG: CPT | Performed by: OBSTETRICS & GYNECOLOGY

## 2025-04-16 PROCEDURE — 3079F DIAST BP 80-89 MM HG: CPT | Performed by: OBSTETRICS & GYNECOLOGY

## 2025-04-16 PROCEDURE — 90651 9VHPV VACCINE 2/3 DOSE IM: CPT | Performed by: OBSTETRICS & GYNECOLOGY

## 2025-04-16 PROCEDURE — 90471 IMMUNIZATION ADMIN: CPT | Performed by: OBSTETRICS & GYNECOLOGY

## 2025-04-16 PROCEDURE — 81514 NFCT DS BV&VAGINITIS DNA ALG: CPT | Performed by: OBSTETRICS & GYNECOLOGY

## 2025-04-16 PROCEDURE — 3008F BODY MASS INDEX DOCD: CPT | Performed by: OBSTETRICS & GYNECOLOGY

## 2025-04-16 RX ORDER — ONDANSETRON 4 MG/1
4 TABLET, ORALLY DISINTEGRATING ORAL EVERY 4 HOURS PRN
COMMUNITY
Start: 2025-04-03

## 2025-04-16 NOTE — PROGRESS NOTES
[facility]  Obstetrics and Gynecology  History & Physical    CC: Follow up appointment - after colposcopy    Subjective:     HPI: Sandra Smith is a 34 year old  female here for follow up after colposcopy.     Patient reports minimal discharge now and resolution of pain and cramping. She did have thicker black discharge of the Monsel's paste but that has resolved.      OB History:  OB History    Para Term  AB Living   1 0 0 0 1 0   SAB IAB Ectopic Multiple Live Births   0 0 0 0 0       Gyne History:     Patient's last menstrual period was 2025 (approximate).    Pap smear history:  2019 - NILM   2024 - LSIL HPV+ (16/8/45 negative)  2025 - LSIL HPV+ (16/18/45 negative)  2025 - colpo: LSIL/CIN1     Denies history of STDs (non-HPV).   Currently sexually active   Birth control: Nuvaring (not currently using)    Review of Systems:  General: no complaints per category. See HPI for additional information.   Breast: no complaints per category. See HPI for additional information.   Respiratory: no complaints per category. See HPI for additional information.   Cardiovascular: no complaints per category. See HPI for additional information.   GI: no complaints per category. See HPI for additional information.   : no complaints per category. See HPI for additional information.   Heme: no complaints per category. See HPI for additional information.       Objective:     Vitals:    25 1427   BP: 104/80   Pulse: 98   Weight: 153 lb (69.4 kg)   Height: 62\"         Body mass index is 27.98 kg/m².    General: AAO.NAD.   CVS exam: normal peripheral perfusion  Chest: non-labored breathing, no tachypnea   Breast: deferred  Abdominal exam: soft, nontender, nondistended  Pelvic exam:   VULVA: normal appearing vulva with no masses, tenderness or lesions  PERINEUM:  normal appearing, no lesions   URETHRAL MEATUS:  normal appearing, no lesions   VAGINA: normal appearing vagina with  normal color and discharge, no lesions  CERVIX: normal appearing cervix without discharge or lesions  UTERUS: uterus is normal size, shape, consistency and nontender  ADNEXA: normal adnexa in size, nontender and no masses  PERIRECTAL: normal appearing, no lesions   Ext: non-tender, no edema    Assessment:     Sandra Smith is a 34 year old  female here for follow up regarding her colposcopy on 2025.         Plan:     Problem List Items Addressed This Visit    None  Visit Diagnoses         Abnormal glandular Papanicolaou smear of cervix    -  Primary      Vaginitis and vulvovaginitis        Relevant Orders    Vaginitis Vaginosis PCR Panel      Need for vaccination        Relevant Orders    HPV 1st Dose (Today)    HPV Vaccine 2nd Dose (Future 1-2 months)    HPV Vaccine 3rd Dose (Future 6 months)            Abnormal Pap Smear  - Reviewed ASCCP guidelines  - Recommend repeat pap and HPV testing in 1 year from abnormal pap smear (2026)  - Discussed recommendation for HPV vaccination series with dose #1 administered today     All of the findings and plan were discussed with the patient.  She notes understanding and agrees with the plan of care.  All questions were answered to the best of my ability at this time.      RTC in 1 year for an annual exam or sooner if needed  (2026)    Emy Barraza MD   [facility] - OBGYN      Note to patient and family   The 21st Century Cures Act makes medical notes available to patients in the interest of transparency.  However, please be advised that this is a medical document.  It is intended as kdqi-ja-ylby communication.  It is written and medical language may contain abbreviations or verbiage that are technical and unfamiliar.  It may appear blunt or direct.  Medical documents are intended to carry relevant information, facts as evident, and the clinical opinion of the practitioner.           This note could include assistance by Dragon voice recognition. Errors in content  may be related to improper recognition by the system; efforts to review and correct have been done but errors may still exist.

## 2025-04-17 LAB
BV BACTERIA DNA VAG QL NAA+PROBE: NEGATIVE
C GLABRATA DNA VAG QL NAA+PROBE: NEGATIVE
C KRUSEI DNA VAG QL NAA+PROBE: NEGATIVE
CANDIDA DNA VAG QL NAA+PROBE: NEGATIVE
T VAGINALIS DNA VAG QL NAA+PROBE: NEGATIVE

## 2025-06-10 ENCOUNTER — NURSE ONLY (OUTPATIENT)
Dept: OBGYN CLINIC | Facility: CLINIC | Age: 35
End: 2025-06-10
Payer: COMMERCIAL

## 2025-06-10 DIAGNOSIS — Z23 NEED FOR VACCINATION: ICD-10-CM

## 2025-06-10 PROCEDURE — 90471 IMMUNIZATION ADMIN: CPT | Performed by: OBSTETRICS & GYNECOLOGY

## 2025-06-10 PROCEDURE — 90651 9VHPV VACCINE 2/3 DOSE IM: CPT | Performed by: OBSTETRICS & GYNECOLOGY

## (undated) NOTE — LETTER
Sandra SARAH Smith, :1990    CONSENT FOR PROCEDURE/SEDATION    1. I authorize the performance upon Sandra SARAH Smith  the following: Colposcopy with biopsy and Endocervical curettage    2. I authorize Dr. Emy Barraza MD (and whomever is designated as the doctor’s assistant), to perform the above-mentioned procedures.    3. If any unforeseen conditions arise during this procedure calling for additional  procedures, operations, or medications (including anesthesia and blood transfusion), I further request and authorize the doctor to do whatever he/she deems advisable in my interest.    4. I consent to the taking and reproduction of any photographs in the course of this procedure for professional purposes.    5. I consent to the administration of such sedation as may be considered necessary or advisable by the physician responsible for this service, with the exception of ______________________________________________________    6. I have been informed by my doctor of the nature and purpose of this procedure sedation, possible alternative methods of treatment, risk involved and possible complications.    7. If I have a Do Not Resuscitate (DNR) order in place, the physician and I (or the individual authorized to consent on my behalf) will discuss and agree as to whether the Do Not Resuscitate (DNR) order will remain in effect or will be discontinued during the performance of the procedure and the applicable recovery period. If the Do Not Resuscitate (DNR) order is discontinued and is to be reinstated following the procedure/recovery period, the physician will determine when the applicable recovery period ends for purposes of reinstating the Do Not Resuscitate (DNR) order.    Signature of Patient:_______________________________________________    Signature of person authorized to consent for patient:  _______________________________________________________________    Relationship to patient:  ____________________________________________    Witness: _________________________________________ Date:___________     Physician Signature: _______________________________ Date:___________